# Patient Record
Sex: FEMALE | Race: BLACK OR AFRICAN AMERICAN | NOT HISPANIC OR LATINO | URBAN - NONMETROPOLITAN AREA
[De-identification: names, ages, dates, MRNs, and addresses within clinical notes are randomized per-mention and may not be internally consistent; named-entity substitution may affect disease eponyms.]

---

## 2020-07-23 ENCOUNTER — IMPORTED ENCOUNTER (OUTPATIENT)
Dept: URBAN - NONMETROPOLITAN AREA CLINIC 1 | Facility: CLINIC | Age: 65
End: 2020-07-23

## 2020-07-23 PROBLEM — H52.4: Noted: 2020-07-23

## 2020-07-23 PROCEDURE — 99204 OFFICE O/P NEW MOD 45 MIN: CPT

## 2020-07-23 PROCEDURE — 92015 DETERMINE REFRACTIVE STATE: CPT

## 2020-07-23 NOTE — PATIENT DISCUSSION
Borderline Glaucoma OUDiscussed diagnosis in detail with patient. Positive family history of disease (mother). IOP at 24 OU. Cup to disc noted at 0.7 OD and 0.65 OS. Continue to monitor. RTC N/A for VF 24-2 OCT and [place] OUDiscussed diagnosis with patient. Reviewed symptoms related to cataract progression. Discussed various treatment options with patient. No treatment required at this time. Continue to monitor. Hyperopia/Presbyopia OUDiscussed refractive status in detail with patient. New glasses Rx given today. Continue to monitor.; 's Notes: MR 7/23/20DFE 7/23/20

## 2022-04-09 ASSESSMENT — TONOMETRY
OD_IOP_MMHG: 24
OS_IOP_MMHG: 24

## 2022-04-09 ASSESSMENT — VISUAL ACUITY
OS_SC: 20/20
OD_SC: 20/20-